# Patient Record
Sex: MALE | Race: WHITE | Employment: FULL TIME | URBAN - METROPOLITAN AREA
[De-identification: names, ages, dates, MRNs, and addresses within clinical notes are randomized per-mention and may not be internally consistent; named-entity substitution may affect disease eponyms.]

---

## 2017-09-27 ENCOUNTER — OFFICE VISIT (OUTPATIENT)
Dept: FAMILY MEDICINE CLINIC | Facility: CLINIC | Age: 27
End: 2017-09-27

## 2017-09-27 VITALS
OXYGEN SATURATION: 98 % | DIASTOLIC BLOOD PRESSURE: 82 MMHG | TEMPERATURE: 99 F | WEIGHT: 199 LBS | RESPIRATION RATE: 16 BRPM | HEART RATE: 85 BPM | BODY MASS INDEX: 24.74 KG/M2 | SYSTOLIC BLOOD PRESSURE: 126 MMHG | HEIGHT: 75 IN

## 2017-09-27 DIAGNOSIS — B86 SCABIES: Primary | ICD-10-CM

## 2017-09-27 PROCEDURE — 99213 OFFICE O/P EST LOW 20 MIN: CPT | Performed by: NURSE PRACTITIONER

## 2017-09-27 RX ORDER — PERMETHRIN 50 MG/G
1 CREAM TOPICAL ONCE
Qty: 60 G | Refills: 1 | Status: SHIPPED | OUTPATIENT
Start: 2017-09-27 | End: 2017-09-27

## 2017-09-27 NOTE — PATIENT INSTRUCTIONS
Simultaneous treatment of the patient and close contacts is recommended based upon the theory that this may reduce risk for the spread of scabies and the recurrence of scabies in the treated patient.  Because mites usually survive for only two to three days down. This includes the palms of the hands, soles of the feet, groin, and under the fingernails. The lotion must be left on for 8 to 14 hours.  In some cases, a second application of lotion is needed a week after the first. Medicines work quickly, but most instructions.

## 2017-09-28 NOTE — PROGRESS NOTES
CHIEF COMPLAINT:   Patient presents with:  Rash: on Hands x 3 days, itchy, between fingers, spreading, like blisters \"can pop\"      HPI:   Tono Crystal is a 28year old male who presents for evaluation of a rash.   Per patient rash started in between fing SKIN: Rash/lesion(s): erythemas papules in between fingers and down hands bilateral, also a few scattered papules to upper arm. No tenderness. No drainage. Consistent with scabies.    EYES: PERRLA, EOMI, conjunctiva are clear  HENT: Head atraumatic, normoce Simultaneous treatment of the patient and close contacts is recommended based upon the theory that this may reduce risk for the spread of scabies and the recurrence of scabies in the treated patient.  Because mites usually survive for only two to three days Scabies infections are usually treated with a prescription lotion that kills the mites. The lotion must be applied to the entire body from the neck down. This includes the palms of the hands, soles of the feet, groin, and under the fingernails.  The lotion © 1234-5346 The [address] [place] [address], [address] [place]. All rights reserved. This information is not intended as a substitute for professional medical care. Always follow your healthcare professional's instructions.             The

## 2017-10-02 ENCOUNTER — OFFICE VISIT (OUTPATIENT)
Dept: FAMILY MEDICINE CLINIC | Facility: CLINIC | Age: 27
End: 2017-10-02

## 2017-10-02 VITALS
HEART RATE: 100 BPM | RESPIRATION RATE: 18 BRPM | OXYGEN SATURATION: 97 % | DIASTOLIC BLOOD PRESSURE: 60 MMHG | TEMPERATURE: 98 F | BODY MASS INDEX: 25 KG/M2 | SYSTOLIC BLOOD PRESSURE: 120 MMHG | WEIGHT: 196 LBS

## 2017-10-02 DIAGNOSIS — B86 SCABIES: Primary | ICD-10-CM

## 2017-10-02 PROCEDURE — 99213 OFFICE O/P EST LOW 20 MIN: CPT | Performed by: NURSE PRACTITIONER

## 2017-10-02 RX ORDER — PREDNISONE 20 MG/1
TABLET ORAL
Qty: 20 TABLET | Refills: 0 | Status: SHIPPED | OUTPATIENT
Start: 2017-10-02 | End: 2018-03-27 | Stop reason: ALTCHOICE

## 2017-10-02 NOTE — PATIENT INSTRUCTIONS
Scabies     To prevent spread of infection, wash clothing, linens, and toys in very hot water. Scabies is an infection caused by very tiny mites that burrow into the skin. The mites are called Sarcoptes scabiei. They cause severe itching.  Though chil Scabies infections are usually treated with a prescription lotion that kills the mites. The lotion must be applied to the entire body from the neck down. This includes the palms of the hands, soles of the feet, groin, and under the fingernails.  The lotion © 1584-4303 [address], [address] [place]. All rights reserved. This information is not intended as a substitute for professional medical care. Always follow your healthcare professional's instructions.

## 2017-10-02 NOTE — PROGRESS NOTES
CHIEF COMPLAINT:   Patient presents with:  Derm Problem: pt c\o of rash , x1wk          HPI:   Josr Foreman is a 28year old male who presents for evaluation of a rash. Per patient rash started in the past 7 days.  Rash has been persistent, spreading since /60 (BP Location: Right arm, Patient Position: Sitting, Cuff Size: adult)   Pulse 100   Temp 98.3 °F (36.8 °C) (Oral)   Resp 18   Wt 196 lb   SpO2 97%   BMI 24.50 kg/m²   GENERAL: well developed, well nourished,in no apparent distress  SKIN: multiple Scabies is an infection caused by very tiny mites that burrow into the skin. The mites are called Sarcoptes scabiei. They cause severe itching. Though children are most commonly infected, anyone can get scabies.  Scabies mites can pass from person to person To prevent reinfection and the spread of scabies to others, follow these instructions:  · Wash the infected person’s clothing, towels, bed linens, cloth toys, and other personal items in very hot, soapy water. Dry them thoroughly.  Do not share among family

## 2018-03-27 ENCOUNTER — OFFICE VISIT (OUTPATIENT)
Dept: FAMILY MEDICINE CLINIC | Facility: CLINIC | Age: 28
End: 2018-03-27

## 2018-03-27 VITALS
WEIGHT: 206 LBS | TEMPERATURE: 98 F | BODY MASS INDEX: 26 KG/M2 | HEART RATE: 81 BPM | RESPIRATION RATE: 18 BRPM | SYSTOLIC BLOOD PRESSURE: 138 MMHG | DIASTOLIC BLOOD PRESSURE: 84 MMHG | OXYGEN SATURATION: 98 %

## 2018-03-27 DIAGNOSIS — A08.4 VIRAL GASTROENTERITIS: Primary | ICD-10-CM

## 2018-03-27 PROCEDURE — 99213 OFFICE O/P EST LOW 20 MIN: CPT | Performed by: NURSE PRACTITIONER

## 2018-03-27 RX ORDER — ONDANSETRON 4 MG/1
4 TABLET, FILM COATED ORAL EVERY 8 HOURS PRN
Qty: 10 TABLET | Refills: 0 | Status: SHIPPED | OUTPATIENT
Start: 2018-03-27 | End: 2018-03-30

## 2018-03-27 NOTE — PATIENT INSTRUCTIONS
Clear liquids  Advance to BRAT (bananas, rice, applesauce, toast)  Follow up in ER for worsening symptoms    Viral Gastroenteritis (Adult)    Gastroenteritis is commonly called the stomach flu.  It is most often caused by a virus that affects the stomach You may use acetaminophen or NSAID medicines like ibuprofen or naproxen to control fever unless another medicine was given. If you have chronic liver or kidney disease, talk with your healthcare provider before using these medicines.  Also talk with your pr · Limit fat intake to less than 15 grams per day. Do this by avoiding margarine, butter, oils, mayonnaise, sauces, gravies, fried foods, peanut butter, meat, poultry, and fish.   · Limit fiber and avoid raw or cooked vegetables, fresh fruits (except bananas

## 2018-03-27 NOTE — PROGRESS NOTES
CHIEF COMPLAINT:   Patient presents with:  Vomiting: pt c\o of vomiting, sweats, headache. x2days       HPI:   Johanna Pereira is a 28year old male who presents for complaints of vomiting. Symptoms have been present for 2  days. Frequency: twice today. NECK: supple,no adenopathy  LUNGS: clear to auscultation bilaterally. No wheezing, rales, or rhonchi. CARDIO: RRR without murmur  GI: No visible scars or masses. BS's present x4. No palpable masses or hepatosplenomegaly.   no tenderness upon palpation i · If symptoms are severe, rest at home for the next 24 hours or until you are feeling better. · Wash your hands with soap and water or use alcohol-based  to prevent the spread of infection. Wash your hands after touching anyone who is sick.   · Wa During the first 24 hours (the first full day), follow the diet below:  · Beverages. Sports drinks, soft drinks without caffeine, ginger ale, mineral water (plain or flavored), decaffeinated tea and coffee.  If you are very dehydrated, sports drinks aren't When to seek medical advice  Call your healthcare provider right away if any of these occur:  · Abdominal pain that gets worse  · Continued vomiting (unable to keep liquids down)  · Frequent diarrhea (more than 5 times a day)  · Blood in vomit or stool (bl

## 2018-05-02 ENCOUNTER — OFFICE VISIT (OUTPATIENT)
Dept: FAMILY MEDICINE CLINIC | Facility: CLINIC | Age: 28
End: 2018-05-02

## 2018-05-02 VITALS
RESPIRATION RATE: 16 BRPM | HEIGHT: 75 IN | TEMPERATURE: 98 F | WEIGHT: 206 LBS | BODY MASS INDEX: 25.61 KG/M2 | OXYGEN SATURATION: 97 % | SYSTOLIC BLOOD PRESSURE: 120 MMHG | DIASTOLIC BLOOD PRESSURE: 80 MMHG | HEART RATE: 86 BPM

## 2018-05-02 DIAGNOSIS — L03.116 CELLULITIS OF LEFT LOWER EXTREMITY: Primary | ICD-10-CM

## 2018-05-02 DIAGNOSIS — L30.9 DERMATITIS: ICD-10-CM

## 2018-05-02 DIAGNOSIS — B86 SCABIES: ICD-10-CM

## 2018-05-02 PROCEDURE — 87186 SC STD MICRODIL/AGAR DIL: CPT | Performed by: NURSE PRACTITIONER

## 2018-05-02 PROCEDURE — 87205 SMEAR GRAM STAIN: CPT | Performed by: NURSE PRACTITIONER

## 2018-05-02 PROCEDURE — 87147 CULTURE TYPE IMMUNOLOGIC: CPT | Performed by: NURSE PRACTITIONER

## 2018-05-02 PROCEDURE — 99213 OFFICE O/P EST LOW 20 MIN: CPT | Performed by: NURSE PRACTITIONER

## 2018-05-02 PROCEDURE — 87070 CULTURE OTHR SPECIMN AEROBIC: CPT | Performed by: NURSE PRACTITIONER

## 2018-05-02 RX ORDER — MUPIROCIN CALCIUM 20 MG/G
1 CREAM TOPICAL 2 TIMES DAILY
Qty: 30 G | Refills: 0 | Status: SHIPPED | OUTPATIENT
Start: 2018-05-02 | End: 2018-05-09

## 2018-05-02 RX ORDER — CEPHALEXIN 500 MG/1
500 CAPSULE ORAL 3 TIMES DAILY
Qty: 21 CAPSULE | Refills: 0 | Status: SHIPPED | OUTPATIENT
Start: 2018-05-02 | End: 2018-05-09

## 2018-05-02 RX ORDER — PERMETHRIN 50 MG/G
CREAM TOPICAL
Qty: 60 G | Refills: 1 | Status: SHIPPED | OUTPATIENT
Start: 2018-05-02

## 2018-05-02 NOTE — PATIENT INSTRUCTIONS
Scabies  Scabies is a skin infection. It is caused by a tiny parasitic insect, or mite, that is too small to see directly. It can be seen under a microscope, but it is usually recognized only by the rash and symptoms it causes.  This can make it hard to d · Use the cream on your body when your skin is cool and dry. Don’t use it after a hot shower or bath. · Usually the cream is put on your whole body. This means from your chin all the way down to your toes. Scabies does not usually affect an adult’s head. Follow up with your healthcare provider, or as advised. Call your provider if your symptoms don’t improve after 1 week, or if new burrows or rashes appear.   When to seek medical advice  Call your healthcare provider right away if any of these occur:  · Yel · Wash your hands often to prevent spreading the infection. In the future, wash your hands before and after you touch cuts, scratches, or bandages.  This will help prevent infection.   When to call your healthcare provider  Call your healthcare provider im Atopic dermatitis symptoms can appear anywhere on the body. But in most cases they vary based on the person’s age. In infants, irritation is often seen on the cheeks, chin, near the mouth, and under the eyelids.  In children ages 3 through 8, skin folds, s

## 2018-05-02 NOTE — PROGRESS NOTES
CHIEF COMPLAINT:   Patient presents with:  Rash: x2 days, red/raised/itchy, both hands spreading to the elbows,   Rash: x1 month, left ankle, red/pus      HPI:     Maria Eugenia Victoria is a 28year old male who presents with concerns of several different rashes. History reviewed. No pertinent past medical history.    Social History:  Smoking status: Current Every Day Smoker                                                   Packs/day: 0.00      Years: 0.00      Smokeless tobacco: Never Used                      Alco 2. Scabies Permethrin as below. May repeat in 1 week if needed. 3. Cellulitis: Concern for cellulitis/skin infeciton. Will treat with keflex. Culture sent. May apply bactroban BID as well.   If any spreading/streaking redness, fevers, swelling seek emerge It may take 4 to 6 weeks for symptoms to appear after being exposed. Everyone living in the house with you, as well as your sexual partners, should be treated at the same time. After the first treatment, you will no longer be contagious.  You may return to · For babies or infants, put mittens on their hands. This will stop them from licking the cream or lotion. It will also stop them from scratching themselves because of the itching.   Other medicines  · An oral medicine called ivermectin may be prescribed fo You have been diagnosed with cellulitis. This is an infection in the deepest layer of the skin. In some cases, the infection also affects the muscle. Cellulitis is caused by bacteria. The bacteria can enter the body through broken skin.  This can happen wit © 4938-4510 The [address]. [address] [facility], [address] [place]. All rights reserved. This information is not intended as a substitute for professional medical care. Always follow your healthcare professional's instructions.         What is © 2346-7835 The [address]. [address] [facility], [facility] [place]. All rights reserved. This information is not intended as a substitute for professional medical care. Always follow your healthcare professional's instructions.             The

## 2018-07-24 ENCOUNTER — OFFICE VISIT (OUTPATIENT)
Dept: FAMILY MEDICINE CLINIC | Facility: CLINIC | Age: 28
End: 2018-07-24
Payer: COMMERCIAL

## 2018-07-24 VITALS
SYSTOLIC BLOOD PRESSURE: 120 MMHG | OXYGEN SATURATION: 98 % | BODY MASS INDEX: 25.95 KG/M2 | HEIGHT: 74 IN | DIASTOLIC BLOOD PRESSURE: 82 MMHG | TEMPERATURE: 98 F | WEIGHT: 202.19 LBS | HEART RATE: 71 BPM | RESPIRATION RATE: 14 BRPM

## 2018-07-24 DIAGNOSIS — M54.2 NECK PAIN: Primary | ICD-10-CM

## 2018-07-24 DIAGNOSIS — M54.50 ACUTE BILATERAL LOW BACK PAIN WITHOUT SCIATICA: ICD-10-CM

## 2018-07-24 PROCEDURE — 99213 OFFICE O/P EST LOW 20 MIN: CPT | Performed by: PHYSICIAN ASSISTANT

## 2018-07-24 RX ORDER — CYCLOBENZAPRINE HCL 10 MG
10 TABLET ORAL NIGHTLY
Qty: 20 TABLET | Refills: 0 | Status: SHIPPED | OUTPATIENT
Start: 2018-07-24 | End: 2018-08-13

## 2018-07-24 RX ORDER — METHYLPREDNISOLONE 4 MG/1
TABLET ORAL
Qty: 1 KIT | Refills: 0 | Status: SHIPPED | OUTPATIENT
Start: 2018-07-24 | End: 2018-08-15

## 2018-07-24 NOTE — PROGRESS NOTES
Patient presents with:  Back Pain: back of neck down to lower back x 2 days    HPI:     Eliza Marti is a 28year old male who presents with a chief complaint of neck pain for 2 days. Pain is worse with position changes-looking left and right.  Pain is wors 4 MG Oral Tablet Therapy Pack; As directed.   - Moist heat to area   - Gentle stretching   - No heavy lifting   - Follow up with PCP if symptoms persist or worsen

## 2018-08-15 ENCOUNTER — HOSPITAL ENCOUNTER (EMERGENCY)
Age: 28
Discharge: HOME | End: 2018-08-15
Payer: COMMERCIAL

## 2018-08-15 VITALS
BODY MASS INDEX: 26 KG/M2 | SYSTOLIC BLOOD PRESSURE: 148 MMHG | DIASTOLIC BLOOD PRESSURE: 89 MMHG | HEART RATE: 96 BPM | OXYGEN SATURATION: 98 % | TEMPERATURE: 98 F | WEIGHT: 202.19 LBS | RESPIRATION RATE: 16 BRPM

## 2018-08-15 DIAGNOSIS — L25.9 CONTACT DERMATITIS, UNSPECIFIED CONTACT DERMATITIS TYPE, UNSPECIFIED TRIGGER: Primary | ICD-10-CM

## 2018-08-15 PROCEDURE — 99283 EMERGENCY DEPT VISIT LOW MDM: CPT

## 2018-08-15 RX ORDER — PREDNISONE 20 MG/1
TABLET ORAL
Qty: 18 TABLET | Refills: 0 | Status: SHIPPED | OUTPATIENT
Start: 2018-08-15

## 2018-08-15 NOTE — ED PROVIDER NOTES
Patient Seen in: [facility] Emergency Department In [place]    History   Patient presents with:  Rash Skin Problem (integumentary)    Stated Complaint: rash to arms    HPI  Patient is a 26-year-old male that presents with bilateral forearm rash.   Patient ED Course   Labs Reviewed - No data to display    ED Course as of Aug 15 1825  ------------------------------------------------------------      [facility]   Patient rashe most consistent with contact dermatitis.   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

## (undated) NOTE — ED AVS SNAPSHOT
Gene Nix   MRN: [id number]    Department:  [facility] Emergency Department in [place]   Date of Visit:  8/15/2018           Disclosure     Insurance plans vary and the physician(s) referred by the ER may not be covered by your plan.  Please contact y tell this physician (or your personal doctor if your instructions are to return to your personal doctor) about any new or lasting problems. The primary care or specialist physician will see patients referred from the [facility] Emergency Department.  Garrett Oneill